# Patient Record
Sex: MALE | Race: OTHER
[De-identification: names, ages, dates, MRNs, and addresses within clinical notes are randomized per-mention and may not be internally consistent; named-entity substitution may affect disease eponyms.]

---

## 2020-11-16 ENCOUNTER — HOSPITAL ENCOUNTER (OUTPATIENT)
Dept: HOSPITAL 47 - LABWHC1 | Age: 25
Discharge: HOME | End: 2020-11-16
Attending: FAMILY MEDICINE
Payer: SELF-PAY

## 2020-11-16 DIAGNOSIS — Z20.828: Primary | ICD-10-CM

## 2021-10-02 ENCOUNTER — HOSPITAL ENCOUNTER (EMERGENCY)
Dept: HOSPITAL 47 - EC | Age: 26
LOS: 1 days | Discharge: HOME | End: 2021-10-03
Payer: COMMERCIAL

## 2021-10-02 DIAGNOSIS — N39.0: ICD-10-CM

## 2021-10-02 DIAGNOSIS — R42: Primary | ICD-10-CM

## 2021-10-02 PROCEDURE — 87491 CHLMYD TRACH DNA AMP PROBE: CPT

## 2021-10-02 PROCEDURE — 85025 COMPLETE CBC W/AUTO DIFF WBC: CPT

## 2021-10-02 PROCEDURE — 80320 DRUG SCREEN QUANTALCOHOLS: CPT

## 2021-10-02 PROCEDURE — 96360 HYDRATION IV INFUSION INIT: CPT

## 2021-10-02 PROCEDURE — 99284 EMERGENCY DEPT VISIT MOD MDM: CPT

## 2021-10-02 PROCEDURE — 84484 ASSAY OF TROPONIN QUANT: CPT

## 2021-10-02 PROCEDURE — 87086 URINE CULTURE/COLONY COUNT: CPT

## 2021-10-02 PROCEDURE — 87591 N.GONORRHOEAE DNA AMP PROB: CPT

## 2021-10-02 PROCEDURE — 81001 URINALYSIS AUTO W/SCOPE: CPT

## 2021-10-02 PROCEDURE — 80306 DRUG TEST PRSMV INSTRMNT: CPT

## 2021-10-02 PROCEDURE — 36415 COLL VENOUS BLD VENIPUNCTURE: CPT

## 2021-10-02 PROCEDURE — 80053 COMPREHEN METABOLIC PANEL: CPT

## 2021-10-02 PROCEDURE — 93005 ELECTROCARDIOGRAM TRACING: CPT

## 2021-10-03 VITALS — HEART RATE: 68 BPM | DIASTOLIC BLOOD PRESSURE: 84 MMHG | TEMPERATURE: 97.9 F | SYSTOLIC BLOOD PRESSURE: 132 MMHG

## 2021-10-03 VITALS — RESPIRATION RATE: 18 BRPM

## 2021-10-03 LAB
ALBUMIN SERPL-MCNC: 4.6 G/DL (ref 3.5–5)
ALP SERPL-CCNC: 104 U/L (ref 38–126)
ALT SERPL-CCNC: 67 U/L (ref 4–49)
ANION GAP SERPL CALC-SCNC: 10 MMOL/L
AST SERPL-CCNC: 43 U/L (ref 17–59)
BASOPHILS # BLD AUTO: 0.1 K/UL (ref 0–0.2)
BASOPHILS NFR BLD AUTO: 1 %
BUN SERPL-SCNC: 8 MG/DL (ref 9–20)
CALCIUM SPEC-MCNC: 9.9 MG/DL (ref 8.4–10.2)
CHLORIDE SERPL-SCNC: 100 MMOL/L (ref 98–107)
CO2 SERPL-SCNC: 24 MMOL/L (ref 22–30)
EOSINOPHIL # BLD AUTO: 0.1 K/UL (ref 0–0.7)
EOSINOPHIL NFR BLD AUTO: 1 %
ERYTHROCYTE [DISTWIDTH] IN BLOOD BY AUTOMATED COUNT: 4.92 M/UL (ref 4.3–5.9)
ERYTHROCYTE [DISTWIDTH] IN BLOOD: 12.7 % (ref 11.5–15.5)
GLUCOSE SERPL-MCNC: 139 MG/DL (ref 74–99)
HCT VFR BLD AUTO: 43.3 % (ref 39–53)
HGB BLD-MCNC: 14.4 GM/DL (ref 13–17.5)
LYMPHOCYTES # SPEC AUTO: 1.3 K/UL (ref 1–4.8)
LYMPHOCYTES NFR SPEC AUTO: 14 %
MCH RBC QN AUTO: 29.2 PG (ref 25–35)
MCHC RBC AUTO-ENTMCNC: 33.2 G/DL (ref 31–37)
MCV RBC AUTO: 87.9 FL (ref 80–100)
MONOCYTES # BLD AUTO: 0.6 K/UL (ref 0–1)
MONOCYTES NFR BLD AUTO: 7 %
NEUTROPHILS # BLD AUTO: 7.1 K/UL (ref 1.3–7.7)
NEUTROPHILS NFR BLD AUTO: 77 %
PH UR: 6 [PH] (ref 5–8)
PLATELET # BLD AUTO: 223 K/UL (ref 150–450)
POTASSIUM SERPL-SCNC: 3.7 MMOL/L (ref 3.5–5.1)
PROT SERPL-MCNC: 7.7 G/DL (ref 6.3–8.2)
RBC UR QL: <1 /HPF (ref 0–5)
SODIUM SERPL-SCNC: 134 MMOL/L (ref 137–145)
SP GR UR: 1 (ref 1–1.03)
SQUAMOUS UR QL AUTO: 1 /HPF (ref 0–4)
UROBILINOGEN UR QL STRIP: <2 MG/DL (ref ?–2)
WBC # BLD AUTO: 9.2 K/UL (ref 3.8–10.6)
WBC #/AREA URNS HPF: 12 /HPF (ref 0–5)

## 2021-10-03 NOTE — ED
General Adult HPI





- General


Chief complaint: Dizziness


Stated complaint: Dizziness


Time Seen by Provider: 10/02/21 23:47


Source: patient, family, RN notes reviewed


Mode of arrival: wheelchair


Limitations: no limitations





- History of Present Illness


Initial comments: 





Patient is a 26-year-old male presenting to the emergency Department with 

complaints of some lightheadedness, dizziness, chest heaviness that started 

after smoking marijuana this evening.  He states he also was drinking throughout

the day, taking Adderall for the last few days.  He states he is not prescribed 

Adderall.  He drank about 5 drinks throughout the day.  He denies any falls or 

trauma.  He states he normally smokes marijuana but does not usually feel like 

this.  He denies any pertinent past medical history, takes no medications.  He 

denies any other drug use.  He denies any chest pain at this time, no difficulty

breathing.  He denies any nausea or vomiting.  No abdominal pain.  He has no 

further complaints at this time.  Upon arrival to the ER, his vitals are stable.





- Related Data


                                  Previous Rx's











 Medication  Instructions  Recorded


 


Cephalexin [Keflex] 500 mg PO Q6HR 7 Days #28 cap 10/03/21











                                    Allergies











Allergy/AdvReac Type Severity Reaction Status Date / Time


 


No Known Allergies Allergy   Verified 10/02/21 23:31














Review of Systems


ROS Statement: 


Those systems with pertinent positive or pertinent negative responses have been 

documented in the HPI.





ROS Other: All systems not noted in ROS Statement are negative.





Past Medical History


Past Medical History: No Reported History


History of Any Multi-Drug Resistant Organisms: None Reported


Past Surgical History: No Surgical Hx Reported


Past Psychological History: No Psychological Hx Reported


Smoking Status: Never smoker


Past Alcohol Use History: Daily


Past Drug Use History: Marijuana





General Exam





- General Exam Comments


Initial Comments: 





GENERAL: 


Patient is well-developed and well-nourished.  Patient is nontoxic and in no 

acute distress.





HEAD: 


Atraumatic, normocephalic.





EYES:


Pupils equal round and reactive to light, extraocular movements intact, sclera 

anicteric, conjunctiva are normal.  Eyelids were unremarkable.





ENT: 


Nares patent, oropharynx clear without exudates.  Moist mucous membranes.





NECK: 


Normal range of motion, supple without lymphadenopathy or JVD.





LUNGS:


Unlabored respirations.  Breath sounds clear to auscultation bilaterally and 

equal.  No wheezes rales or rhonchi.





HEART:


Regular rate and rhythm without murmurs, rubs or gallops.





ABDOMEN: 


Soft, nontender, normoactive bowel sounds.  No guarding, no rebound.  No masses 

appreciated.





: Deferred 





MUSCULOSKELETAL: 


Normal extremities with adequate strength and normal range of motion, no pitting

 or edema.  No clubbing or cyanosis.





NEUROLOGICAL: 


Patient is alert and oriented x 3.  Motor and sensory are also intact.  Cranial 

nerves II through XII grossly intact.  Symmetrical smile.  Normal speech, normal

 gait.   





PSYCH:


Normal mood, normal affect.





SKIN:


 Warm, Dry, normal turgor, no rashes or lesions noted.





Course


                                   Vital Signs











  10/02/21 10/03/21 10/03/21





  23:27 00:50 02:18


 


Temperature 98.5 F  97.9 F


 


Pulse Rate 104 H 90 68


 


Respiratory 20 18 18





Rate   


 


Blood Pressure 163/95 142/82 132/84


 


O2 Sat by Pulse 99 99 98





Oximetry   














EKG Findings





- EKG Comments:


EKG Findings:: Normal sinus rhythm, normal ECG, no signs of acute process.  

Ventricular rate 90, WI interval 178, .





Medical Decision Making





- Medical Decision Making





Patient is a 26-year-old male arriving with lightheadedness, dizziness and chest

 heaviness after he was smoking marijuana today.  He also admits to drinking 

alcohol and taking Adderall for the last 2-3 days.  He does not normally take 

Adderall, is not prescribed this.  He denies any other drug use.  At the time 

examination, he has no chest pain, no real complaints other than feeling "off." 

 His vitals are stable, his exam is unremarkable, EKG shows normal sinus rhythm.

  Labs are all within normal limits, troponin is negative, urine did show 

moderate leukocyte Estrace, 12 wbc's and cleansed.  I did send for urine 

culture.  Denies any concerns for STDs, I did add on a cardiac and chlamydia 

just for thoroughness.  I will start him on Keflex for possible UTI.  Toxicology

 is positive for amphetamines and marijuana, serum alcohol is normal.  Patient 

received some fluids and reports improvement of symptoms.  I discussed with him 

to not combine drugs and to not take her prescription drugs that were not 

prescribed to him.  He is agreeable to this.  He is stable for discharge.  

Follow up with his family doctor.  Return parameters were discussed with him and

 he verbalized understanding.  Case discussed with Dr. Lewis.





- Lab Data


Result diagrams: 


                                 10/03/21 00:37





                                 10/03/21 00:37


                                   Lab Results











  10/03/21 10/03/21 10/03/21 Range/Units





  00:37 00:37 00:37 


 


WBC  9.2    (3.8-10.6)  k/uL


 


RBC  4.92    (4.30-5.90)  m/uL


 


Hgb  14.4    (13.0-17.5)  gm/dL


 


Hct  43.3    (39.0-53.0)  %


 


MCV  87.9    (80.0-100.0)  fL


 


MCH  29.2    (25.0-35.0)  pg


 


MCHC  33.2    (31.0-37.0)  g/dL


 


RDW  12.7    (11.5-15.5)  %


 


Plt Count  223    (150-450)  k/uL


 


MPV  9.1    


 


Neutrophils %  77    %


 


Lymphocytes %  14    %


 


Monocytes %  7    %


 


Eosinophils %  1    %


 


Basophils %  1    %


 


Neutrophils #  7.1    (1.3-7.7)  k/uL


 


Lymphocytes #  1.3    (1.0-4.8)  k/uL


 


Monocytes #  0.6    (0-1.0)  k/uL


 


Eosinophils #  0.1    (0-0.7)  k/uL


 


Basophils #  0.1    (0-0.2)  k/uL


 


Sodium    134 L  (137-145)  mmol/L


 


Potassium    3.7  (3.5-5.1)  mmol/L


 


Chloride    100  ()  mmol/L


 


Carbon Dioxide    24  (22-30)  mmol/L


 


Anion Gap    10  mmol/L


 


BUN    8 L  (9-20)  mg/dL


 


Creatinine    0.88  (0.66-1.25)  mg/dL


 


Est GFR (CKD-EPI)AfAm    >90  (>60 ml/min/1.73 sqM)  


 


Est GFR (CKD-EPI)NonAf    >90  (>60 ml/min/1.73 sqM)  


 


Glucose    139 H  (74-99)  mg/dL


 


Calcium    9.9  (8.4-10.2)  mg/dL


 


Total Bilirubin    0.7  (0.2-1.3)  mg/dL


 


AST    43  (17-59)  U/L


 


ALT    67 H  (4-49)  U/L


 


Alkaline Phosphatase    104  ()  U/L


 


Troponin I     (0.000-0.034)  ng/mL


 


Total Protein    7.7  (6.3-8.2)  g/dL


 


Albumin    4.6  (3.5-5.0)  g/dL


 


Urine Color   Light Yellow   


 


Urine Appearance   Clear   (Clear)  


 


Urine pH   6.0   (5.0-8.0)  


 


Ur Specific Gravity   1.005   (1.001-1.035)  


 


Urine Protein   Negative   (Negative)  


 


Urine Glucose (UA)   Negative   (Negative)  


 


Urine Ketones   Negative   (Negative)  


 


Urine Blood   Negative   (Negative)  


 


Urine Nitrite   Negative   (Negative)  


 


Urine Bilirubin   Negative   (Negative)  


 


Urine Urobilinogen   <2.0   (<2.0)  mg/dL


 


Ur Leukocyte Esterase   Moderate H   (Negative)  


 


Urine RBC   <1   (0-5)  /hpf


 


Urine WBC   12 H   (0-5)  /hpf


 


Urine WBC Clumps   Rare H   (None)  /hpf


 


Ur Squamous Epith Cells   1   (0-4)  /hpf


 


Urine Bacteria   Rare H   (None)  /hpf


 


Urine Opiates Screen   Not Detected   (NotDetected)  


 


Ur Oxycodone Screen   Not Detected   (NotDetected)  


 


Urine Methadone Screen   Not Detected   (NotDetected)  


 


Ur Propoxyphene Screen   Not Detected   (NotDetected)  


 


Ur Barbiturates Screen   Not Detected   (NotDetected)  


 


U Tricyclic Antidepress   Not Detected   (NotDetected)  


 


Ur Phencyclidine Scrn   Not Detected   (NotDetected)  


 


Ur Amphetamines Screen   Detected H   (NotDetected)  


 


U Methamphetamines Scrn   Not Detected   (NotDetected)  


 


U Benzodiazepines Scrn   Not Detected   (NotDetected)  


 


Urine Cocaine Screen   Not Detected   (NotDetected)  


 


U Marijuana (THC) Screen   Detected H   (NotDetected)  


 


Serum Alcohol    <10  mg/dL














  10/03/21 Range/Units





  00:37 


 


WBC   (3.8-10.6)  k/uL


 


RBC   (4.30-5.90)  m/uL


 


Hgb   (13.0-17.5)  gm/dL


 


Hct   (39.0-53.0)  %


 


MCV   (80.0-100.0)  fL


 


MCH   (25.0-35.0)  pg


 


MCHC   (31.0-37.0)  g/dL


 


RDW   (11.5-15.5)  %


 


Plt Count   (150-450)  k/uL


 


MPV   


 


Neutrophils %   %


 


Lymphocytes %   %


 


Monocytes %   %


 


Eosinophils %   %


 


Basophils %   %


 


Neutrophils #   (1.3-7.7)  k/uL


 


Lymphocytes #   (1.0-4.8)  k/uL


 


Monocytes #   (0-1.0)  k/uL


 


Eosinophils #   (0-0.7)  k/uL


 


Basophils #   (0-0.2)  k/uL


 


Sodium   (137-145)  mmol/L


 


Potassium   (3.5-5.1)  mmol/L


 


Chloride   ()  mmol/L


 


Carbon Dioxide   (22-30)  mmol/L


 


Anion Gap   mmol/L


 


BUN   (9-20)  mg/dL


 


Creatinine   (0.66-1.25)  mg/dL


 


Est GFR (CKD-EPI)AfAm   (>60 ml/min/1.73 sqM)  


 


Est GFR (CKD-EPI)NonAf   (>60 ml/min/1.73 sqM)  


 


Glucose   (74-99)  mg/dL


 


Calcium   (8.4-10.2)  mg/dL


 


Total Bilirubin   (0.2-1.3)  mg/dL


 


AST   (17-59)  U/L


 


ALT   (4-49)  U/L


 


Alkaline Phosphatase   ()  U/L


 


Troponin I  <0.012  (0.000-0.034)  ng/mL


 


Total Protein   (6.3-8.2)  g/dL


 


Albumin   (3.5-5.0)  g/dL


 


Urine Color   


 


Urine Appearance   (Clear)  


 


Urine pH   (5.0-8.0)  


 


Ur Specific Gravity   (1.001-1.035)  


 


Urine Protein   (Negative)  


 


Urine Glucose (UA)   (Negative)  


 


Urine Ketones   (Negative)  


 


Urine Blood   (Negative)  


 


Urine Nitrite   (Negative)  


 


Urine Bilirubin   (Negative)  


 


Urine Urobilinogen   (<2.0)  mg/dL


 


Ur Leukocyte Esterase   (Negative)  


 


Urine RBC   (0-5)  /hpf


 


Urine WBC   (0-5)  /hpf


 


Urine WBC Clumps   (None)  /hpf


 


Ur Squamous Epith Cells   (0-4)  /hpf


 


Urine Bacteria   (None)  /hpf


 


Urine Opiates Screen   (NotDetected)  


 


Ur Oxycodone Screen   (NotDetected)  


 


Urine Methadone Screen   (NotDetected)  


 


Ur Propoxyphene Screen   (NotDetected)  


 


Ur Barbiturates Screen   (NotDetected)  


 


U Tricyclic Antidepress   (NotDetected)  


 


Ur Phencyclidine Scrn   (NotDetected)  


 


Ur Amphetamines Screen   (NotDetected)  


 


U Methamphetamines Scrn   (NotDetected)  


 


U Benzodiazepines Scrn   (NotDetected)  


 


Urine Cocaine Screen   (NotDetected)  


 


U Marijuana (THC) Screen   (NotDetected)  


 


Serum Alcohol   mg/dL














Disposition


Clinical Impression: 


 Lightheadedness, UTI (urinary tract infection)





Disposition: HOME SELF-CARE


Condition: Stable


Instructions (If sedation given, give patient instructions):  Dizziness (ED)


Additional Instructions: 


Please return to the Emergency Department if symptoms worsen or any other 

concerns.


Take antibiotic as prescribed.


Discontinue use of multiple drugs.


Follow-up with your family doctor.


Prescriptions: 


Cephalexin [Keflex] 500 mg PO Q6HR 7 Days #28 cap


Is patient prescribed a controlled substance at d/c from ED?: No


Referrals: 


None,Stated [Primary Care Provider] - 1-2 days


Time of Disposition: 02:02

## 2021-11-04 ENCOUNTER — HOSPITAL ENCOUNTER (OUTPATIENT)
Dept: HOSPITAL 47 - RADXRMAIN | Age: 26
End: 2021-11-04
Attending: FAMILY MEDICINE
Payer: COMMERCIAL

## 2021-11-04 DIAGNOSIS — M25.561: Primary | ICD-10-CM

## 2021-11-05 NOTE — XR
Right knee

 

HISTORY: Pain

 

3 views the right knee

 

Bone mineralization, joint spaces, alignment are maintained with the exception of some possible mild 
joint space loss medially. There is mild spurring medial compartment. Suprapatellar increased density
 may be indicative of small joint effusion.

 

IMPRESSION: There may be small joint effusion. Knee MRI may be of benefit. Mild osteoarthritic change
 is questioned.

## 2023-07-05 ENCOUNTER — HOSPITAL ENCOUNTER (EMERGENCY)
Dept: HOSPITAL 47 - EC | Age: 28
Discharge: HOME | End: 2023-07-05
Payer: COMMERCIAL

## 2023-07-05 VITALS — RESPIRATION RATE: 18 BRPM | DIASTOLIC BLOOD PRESSURE: 79 MMHG | HEART RATE: 85 BPM | SYSTOLIC BLOOD PRESSURE: 128 MMHG

## 2023-07-05 VITALS — TEMPERATURE: 98 F

## 2023-07-05 DIAGNOSIS — S43.005A: Primary | ICD-10-CM

## 2023-07-05 DIAGNOSIS — F12.90: ICD-10-CM
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
## 2023-07-05 PROCEDURE — 99284 EMERGENCY DEPT VISIT MOD MDM: CPT

## 2023-07-05 NOTE — ED
Upper Extremity HPI





- General


Chief Complaint: Extremity Injury, Upper


Stated Complaint: Shoulder dislocation


Time Seen by Provider: 07/05/23 04:37


Source: patient, EMS


Mode of arrival: EMS


Limitations: no limitations





- History of Present Illness


Initial Comments: 





This patient is a 28-year-old man who presents to have evaluation of left 

shoulder injury.  The patient states she had been drinking and then lost balance

and fell.  He states that he landed on the anterolateral aspect of the shoulder.

 He noted pain and swelling.  Pain worse with movement of the left arm.  

Slightly better with supporting the left arm.  No weakness or numbness into the 

hand or fingers area


MD Complaint: Injury to:: left, shoulder


-: minutes(s)


Other Extremity Injury: Shoulder: Left


Other Injuries: none


Handedness: right


Place: outdoors


Improves With: none


Worsens With: movement of extremity


Context: fall


Associated Symptoms: denies other symptoms





- Related Data


                                  Previous Rx's











 Medication  Instructions  Recorded


 


Cephalexin [Keflex] 500 mg PO Q6HR 7 Days #28 cap 10/03/21


 


HYDROcodone/APAP 5-325MG [Norco 1 tab PO Q4HR PRN 3 Days #18 tab 07/05/23





5-325]  


 


Ibuprofen [Motrin] 600 mg PO Q8HR PRN #20 tab 07/05/23











                                    Allergies











Allergy/AdvReac Type Severity Reaction Status Date / Time


 


No Known Allergies Allergy   Verified 10/02/21 23:31














Review of Systems


ROS Statement: 


Those systems with pertinent positive or pertinent negative responses have been 

documented in the HPI.





ROS Other: All systems not noted in ROS Statement are negative.


Constitutional: Denies: fever, weakness


Respiratory: Denies: cough, dyspnea


Cardiovascular: Denies: chest pain, palpitations, syncope


Gastrointestinal: Denies: abdominal pain, vomiting


Musculoskeletal: Reports: as per HPI, arthralgia.  Denies: back pain


Neurological: Denies: headache, weakness, numbness, paresthesias





Past Medical History


Past Medical History: No Reported History


History of Any Multi-Drug Resistant Organisms: None Reported


Past Surgical History: No Surgical Hx Reported


Past Psychological History: No Psychological Hx Reported


Smoking Status: Never smoker


Past Alcohol Use History: Heavy


Past Drug Use History: Marijuana





General Exam


Limitations: no limitations


General appearance: alert, in no apparent distress


Head exam: Present: atraumatic, normocephalic


Eye exam: Present: normal appearance.  Absent: scleral icterus, conjunctival 

injection


Neck exam: Present: normal inspection, full ROM.  Absent: tenderness


Respiratory exam: Present: normal lung sounds bilaterally.  Absent: respiratory 

distress, wheezes, rales, rhonchi, stridor, chest wall tenderness, accessory 

muscle use


Cardiovascular Exam: Present: regular rate, normal rhythm, normal heart sounds. 

 Absent: systolic murmur, diastolic murmur, rubs, gallop


GI/Abdominal exam: Present: soft.  Absent: distended, tenderness, guarding, 

rebound, rigid, mass


Extremities exam: Present: tenderness, normal capillary refill, other (The 

patient has deformity that appears to be before meals separation.  There is 

tenderness at the before meals joint.  There is no bony tenderness or deformity 

of the humerus.  The patient does tolerate rotation at the humerus.  

Neurovascular exam of the left upper extremity is normal).  Absent: full ROM


Back exam: Present: normal inspection.  Absent: vertebral tenderness


Neurological exam: Present: alert.  Absent: motor sensory deficit


Skin exam: Present: warm, dry, intact, normal color.  Absent: rash





Course


                                   Vital Signs











  07/05/23 07/05/23





  04:00 05:38


 


Temperature 98.0 F 98.0 F


 


Pulse Rate 81 85


 


Respiratory 16 18





Rate  


 


Blood Pressure 131/76 128/79


 


O2 Sat by Pulse 97 99





Oximetry  














Medical Decision Making





- Medical Decision Making








The patient had left shoulder x-ray which does show before meals joint 

separation, by my interpretation








Was pt. sent in by a medical professional or institution ([facility], NP, urgent 

care, hospital, or nursing home...) When possible be specific


@  -[No]


Did you speak to anyone other than the patient for history (EMS, parent, family,

 police, friend...)? What history was obtained from this source 


@  -[No]


Did you review nursing and triage notes (agree or disagree)?  Why? 


@  -[I reviewed and agree with nursing and triage notes]


Were old charts reviewed (outside hosp., previous admission, EMS record, old 

EKG, old radiological studies, urgent care reports/EKG's, nursing home records)?

Report findings 


@  -[No old charts were reviewed]


Differential Diagnosis (chest pain, altered mental status, abdominal pain women,

abdominal pain men, vaginal bleeding, weakness, fever, dyspnea, syncope, 

headache, dizziness, GI bleed, back pain, seizure, CVA, palpatations, mental 

health, musculoskeletal)? 


@  -[Differential Musculoskeletal


Muscular strain, contusion, ligament sprain, fracture, arthritis, septic 

arthritis, bursitis, cellulitis, muscle spasm, nerve compression, DVT, arterial 

occlusion, herpes zoster, electrolyte abnormality, tumor.... This is not meant 

to be in all inclusive list


EKG interpreted by me (3pts min.).


@  -[


X-rays interpreted by me (1pt min.).


@  -[I interpreted as above


CT interpreted by me (1pt min.).


@  -[None done]


U/S interpreted by me (1pt. min.).


@  -[None done]


What testing was considered but not performed or refused? (CT, X-rays, U/S, 

labs)? Why?


@  -[None]


What meds were considered but not given or refused? Why?


@  -[None]


Did you discuss the management of the patient with other professionals 

(professionals i.e. , PA, NP, lab, RT, psych nurse, , , 

teacher, , )? Give summary


@  -[No]


Was smoking cessation discussed for >3mins.?


@  -[No]


Was critical care preformed (if so, how long)?


@  -[No]


Were there social determinants of health that impacted care today? How? 

(Homelessness, low income, unemployed, alcoholism, drug addiction, 

transportation, low edu. Level, literacy, decrease access to med. care, longterm, 

rehab)?


@  -[No]


Was there de-escalation of care discussed even if they declined (Discuss DNR or 

withdrawal of care, Hospice)? DNR status


@  -[No]


What co-morbidities impacted this encounter? (DM, HTN, Smoking, COPD, CAD, 

Cancer, CVA, ARF, Chemo, Hep., AIDS, mental health diagnosis, sleep apnea, 

morbid obesity)?


@  -[None]


Was patient admitted / discharged? Hospital course, mention meds given and 

route, prescriptions, significant lab abnormalities, going to OR and other 

pertinent info.


@  -[The patient is discharged to follow-up with orthopedics as outpatient.  We 

discussed appropriate return parameters and follow-up


Undiagnosed new problem with uncertain prognosis?


@  -[No]


Drug Therapy requiring intensive monitoring for toxicity (Heparin, Nitro, 

Insulin, Cardizem)?


@  -[No]


Were any procedures done?


@  -[No]


Diagnosis/symptom?


@  -[Acute fall injury


Acute before meals joint separation, left


Acute, or Chronic, or Acute on Chronic?


@  -[default]


Uncomplicated (without systemic symptoms) or Complicated (systemic symptoms)?


@  -[Uncomplicated


Side effects of treatment?


@  -[No]


Exacerbation, Progression, or Severe Exacerbation?


@  -[No]


Poses a threat to life or bodily function? How? (Chest pain, USA, MI, pneumonia,

 PE, COPD, DKA, ARF, appy, cholecystitis, CVA, Diverticulitis, Homicidal, 

Suicidal, threat to staff... and all critical care pts)


@  -[No]





Disposition


Clinical Impression: 


 Shoulder separation





Disposition: HOME SELF-CARE


Condition: Good


Instructions (If sedation given, give patient instructions):  Acromioclavicular 

Separation (ED)


Prescriptions: 


Ibuprofen [Motrin] 600 mg PO Q8HR PRN #20 tab


 PRN Reason: Pain


HYDROcodone/APAP 5-325MG [Norco 5-325] 1 tab PO Q4HR PRN 3 Days #18 tab


 PRN Reason: Pain


Is patient prescribed a controlled substance at d/c from ED?: No


Referrals: 


Gary Garcia MD [STAFF PHYSICIAN] - 1-2 days


Raimundo Rojas DO [Doctor of Osteopathic Medicine] - 1-2 days

## 2023-07-05 NOTE — XR
EXAM:

  XR Left Shoulder Complete, 2 or More Views

 

CLINICAL HISTORY:

  ITS.REASON XR Reason: fall, deformity

 

TECHNIQUE:

  Two or more views of the left shoulder.

 

COMPARISON:

  No relevant prior studies available.

 

FINDINGS:

  Bones/joints:  AC separation with superior migration of the clavicle 

relative to the acromion.  Significant widening of the coracoclavicular 

joint (23 mm).  Anatomic alignment of the glenohumeral joint.  No acute 

fracture.

  Soft tissues:  Unremarkable.

 

IMPRESSION:     

  AC separation with CC disruption (grade 5 separation)

## 2024-10-09 ENCOUNTER — HOSPITAL ENCOUNTER (EMERGENCY)
Dept: HOSPITAL 47 - EC | Age: 29
Discharge: HOME | End: 2024-10-09
Payer: COMMERCIAL

## 2024-10-09 VITALS — SYSTOLIC BLOOD PRESSURE: 120 MMHG | RESPIRATION RATE: 16 BRPM | TEMPERATURE: 98.4 F | DIASTOLIC BLOOD PRESSURE: 80 MMHG

## 2024-10-09 VITALS — HEART RATE: 74 BPM

## 2024-10-09 PROCEDURE — 99282 EMERGENCY DEPT VISIT SF MDM: CPT

## 2024-10-09 PROCEDURE — 96372 THER/PROPH/DIAG INJ SC/IM: CPT

## 2024-10-09 RX ADMIN — KETOROLAC TROMETHAMINE STA MG: 15 INJECTION, SOLUTION INTRAMUSCULAR; INTRAVENOUS at 16:21

## 2024-10-09 NOTE — ED
Lower Extremity Injury HPI





- General


Chief Complaint: Extremity Injury, Lower


Stated Complaint: R knee pain


Time Seen by Provider: 10/09/24 16:01


Source: patient, RN notes reviewed


Mode of arrival: wheelchair


Limitations: no limitations





- History of Present Illness


Initial Comments: 


29-year-old male presents emergency department chief complaint of right knee 

pain.  States that last week he was standing on a stepstool at work and he went 

to step down and stepped on a saw that was on the ground which caused him to 

twist his right knee.  He denies any injuries at the time of this event.  Over 

the past week he has been experiencing worsening pain in the right knee that is 

exacerbated with prolonged standing and with flexion and climbing stairs.  He h

as been taking Motrin and occasionally Tylenol at home with some relief.  

Patient was supposed to have an appointment today with orthopedic specialist 

outpatient however when he arrived for his appointment he was informed that they

do not take his insurance.  Additionally, patient states that he has had 

meniscus injuries to his right knee in the past with no previous surgeries.








- Related Data


                                  Previous Rx's











 Medication  Instructions  Recorded


 


Cephalexin [Keflex] 500 mg PO Q6HR 7 Days #28 cap 10/03/21


 


HYDROcodone/APAP 5-325MG [Norco 1 tab PO Q4HR PRN 3 Days #18 tab 07/05/23





5-325]  


 


Ibuprofen [Motrin] 600 mg PO Q8HR PRN #20 tab 07/05/23











                                    Allergies











Allergy/AdvReac Type Severity Reaction Status Date / Time


 


No Known Allergies Allergy   Verified 10/09/24 15:52














Review of Systems


ROS Statement: 


Those systems with pertinent positive or pertinent negative responses have been 

documented in the HPI.





ROS Other: All systems not noted in ROS Statement are negative.





Past Medical History


Past Medical History: No Reported History


History of Any Multi-Drug Resistant Organisms: None Reported


Past Surgical History: No Surgical Hx Reported


Past Psychological History: No Psychological Hx Reported


Smoking Status: Never smoker


Past Alcohol Use History: Heavy


Past Drug Use History: Marijuana





General Exam


Limitations: no limitations


General appearance: alert, in no apparent distress


ENT exam: Present: normal exam, mucous membranes moist


Neck exam: Present: normal inspection.  Absent: tenderness, meningismus, 

lymphadenopathy


Respiratory exam: Present: normal lung sounds bilaterally.  Absent: respiratory 

distress, wheezes, rales, rhonchi, stridor


Cardiovascular Exam: Present: regular rate, normal rhythm, normal heart sounds. 

 Absent: systolic murmur, diastolic murmur, rubs, gallop, clicks


GI/Abdominal exam: Present: soft, normal bowel sounds.  Absent: distended, 

tenderness, guarding, rebound, rigid


  ** Right


Knee exam: Present: normal inspection, full ROM (pain with ROM), tenderness 

(medial knee), pain/laxity with valgus, pain/laxity with varus


Neurovascular tendon exam: Present: no vascular compromise.  Absent: pulse 

deficit, abnormal cap refill


Gait: observed and normal


Back exam: Present: normal inspection


Skin exam: Present: warm, dry, intact, normal color.  Absent: rash





Course


                                   Vital Signs











  10/09/24 10/09/24





  15:49 17:43


 


Temperature 97.8 F 98.4 F


 


Pulse Rate 74 74


 


Respiratory 18 16





Rate  


 


Blood Pressure 117/73 120/80


 


O2 Sat by Pulse 97 98





Oximetry  














Medical Decision Making





- Medical Decision Making


Was pt. sent in by a medical professional or institution (, PA, NP, urgent 

care, hospital, or nursing home...) When possible be specific


@ -No


Did you speak to anyone other than the patient for history (EMS, parent, family,

 police, friend...)? What history was obtained from this source 


@ -No


Did you review nursing and triage notes (agree or disagree)? Why? 


@ -I reviewed and agree with nursing and triage notes


Were old charts reviewed (outside hosp., previous admission, EMS record, old 

EKG, old radiological studies, urgent care reports/EKG's, nursing home records)?

 Report findings 


@ -No old charts were reviewed


Differential Diagnosis (chest pain, altered mental status, abdominal pain women,

 abdominal pain men, vaginal bleeding, weakness, fever, dyspnea, syncope, 

headache, dizziness, GI bleed, back pain, seizure, CVA, palpatations, mental 

health, musculoskeletal)? 


@ -Differential Musculoskeletal


Muscular strain, contusion, ligament sprain, fracture, arthritis, septic 

arthritis, bursitis, cellulitis, muscle spasm, nerve compression, DVT, arterial 

occlusion, herpes zoster, electrolyte abnormality, tumor.... This is not meant 

to be in all inclusive list


EKG interpreted by me (3pts min.).


@ -none


X-rays interpreted by me (1pt min.).


@ -XR the right knee no acute fracture dislocation with a small suprapatellar 

effusion.


CT interpreted by me (1pt min.).


@ -None done


U/S interpreted by me (1pt. min.).


@ -None done


What testing was considered but not performed or refused? (CT, X-rays, U/S, 

labs)? Why?


@ -None


What meds were considered but not given or refused? Why?


@ -None


Did you discuss the management of the patient with other professionals 

(professionals i.e. , PA, NP, lab, RT, psych nurse, , , 

teacher, , )? Give summary


@ -No


Was smoking cessation discussed for >3mins.?


@ -No


Was critical care preformed (if so, how long)?


@ -No


Were there social determinants of health that impacted care today? How? 

(Homelessness, low income, unemployed, alcoholism, drug addiction, 

transportation, low edu. Level, literacy, decrease access to med. care, [facility], 

rehab)?


@ -No


Was there de-escalation of care discussed even if they declined (Discuss DNR or 

withdrawal of care, Hospice)? DNR status


@ -No


What co-morbidities impacted this encounter? (DM, HTN, Smoking, COPD, CAD, 

Cancer, CVA, ARF, Chemo, Hep., AIDS, mental health diagnosis, sleep apnea, 

morbid obesity)?


@ -None


Was patient admitted / discharged? Hospital course, mention meds given and 

route, prescriptions, significant lab abnormalities, going to OR and other 

pertinent info.


@ -discharged. 29-year-old male with right knee pain.  Patient's pain is exac

erbated with valgus and varus stress/to palpation of the medial knee.  Patient's

 pain is exacerbated with ambulation.  And flexion as well.  There are no 

neurovascular deficits.  Patient provided dose of Toradol pending x-ray imaging 

results.  He is agree with this plan. X-ray negative for acute process.  Open 

the patient follows up with primary care provider or orthopedic specialist for 

further evaluation for potential MRI for further evaluation of the soft tissue. 

 All questions answered at bedside and strict return parameters discussed with 

the patient he is verbalized understanding.  Patient is provided with a work 

note as well.  Discussed with Dr. Alva


Undiagnosed new problem with uncertain prognosis?


@ -No


Drug Therapy requiring intensive monitoring for toxicity (Heparin, Nitro, 

Insulin, Cardizem)?


@ -No


Were any procedures done?


@ -No


Diagnosis/symptom?


@ -Right knee pain


Acute, or Chronic, or Acute on Chronic?


@ -Acute


Uncomplicated (without systemic symptoms) or Complicated (systemic symptoms)?


@ -Uncomplicated


Side effects of treatment?


@ -No


Exacerbation, Progression, or Severe Exacerbation?


@ -No


Poses a threat to life or bodily function? How? (Chest pain, USA, MI, pneumonia,

 PE, COPD, DKA, ARF, appy, cholecystitis, CVA, Diverticulitis, Homicidal, 

Suicidal, threat to staff... and all critical care pts)


@ -No








Disposition


Clinical Impression: 


 Knee pain, acute





Disposition: HOME SELF-CARE


Condition: Good


Instructions (If sedation given, give patient instructions):  Knee Pain (ED)


Additional Instructions: 


Please return to the Emergency Department if symptoms worsen or any other 

concerns.  Recommend follow-up outpatient primary care provider for further 

evaluation and possible scheduled for MRI for further evaluation of the soft 

tissue of the right knee.


Is patient prescribed a controlled substance at d/c from ED?: No


Referrals: 


Gary Garcia MD [Primary Care Provider] - 1-2 days


Meng Goodwin MD [STAFF PHYSICIAN] - 1-2 days


Time of Disposition: 17:21

## 2024-10-09 NOTE — XR
Evaluation EXAMINATION TYPE: XR knee complete RT

 

DATE OF EXAM: 10/9/2024

 

CLINICAL HISTORY: Is

 

TECHNIQUE:  Three views of the right knee are obtained.

 

COMPARISON: None.

 

FINDINGS:  There is no acute fracture/dislocation.  The tri-compartment joint spaces appear within no
rmal limits.  The overlying soft tissue appears unremarkable. Small suprapatellar effusion.

 

IMPRESSION:  There is no acute fracture or dislocation.ICD 10 NO FRACTURE, INITIAL EVALUATION

 

[facility], Workstation: [id number], 10/9/2024 5:09 PM

## 2024-10-21 ENCOUNTER — HOSPITAL ENCOUNTER (OUTPATIENT)
Dept: HOSPITAL 47 - RADMRIMAIN | Age: 29
Discharge: HOME | End: 2024-10-21
Attending: ORTHOPAEDIC SURGERY
Payer: MEDICARE

## 2024-10-21 NOTE — MR
EXAMINATION TYPE: MR knee RT wo con

 

DATE OF EXAM: 10/21/2024

 

COMPARISON: Right knee x-ray October 9, 2024

 

HISTORY: Right knee pain for 1 month due to stepping on saw while coming down ladder.

 

TECHNIQUE: 

Multiplanar, multisequence images of the knee is performed without IV contrast.

 

FINDINGS:

 

MEDIAL MENISCUS: Anterior and posterior horns are intact without tear.

 

LATERAL MENISCUS: Anterior and posterior horns are intact without tear.

 

CRUCIATE LIGAMENTS: The posterior cruciate ligaments is intact and unremarkable. Nonvisualization of 
normal anterior cruciate ligament consistent with complete tear.

 

COLLATERAL LIGAMENTS: The medial collateral ligament and lateral collateral ligament complex are inta
ct. Mild fluid signal surrounds medial collateral ligament particularly deeper fibers. 

 

EXTENSOR MECHANISM: Visualized quadriceps and patellar tendons are intact.

 

EFFUSION: Moderate to large sized suprapatellar joint effusion.

 

POPLITEAL CYST:  No popliteal/baker cyst.

 

TRICOMPARTMENT SPACES: Tricompartment  Joint spaces are preserved. No significant spurring is seen.

 

CARTILAGE: Tricompartmental articular cartilage is maintained.

 

BONE MARROW SIGNAL: Tiny subchondral cyst anterior central proximal tibia sagittal image 15.

 

OTHER: Moderate superficial infrapatellar subcutaneous edema.

 

IMPRESSION: 

1. Complete ACL tear.

2. Moderate to large-sized suprapatellar joint effusion.

3. Mild MCL sprain injury.

 

[facility], Workstation: [id number], 10/21/2024 10:34 PM